# Patient Record
Sex: FEMALE | HISPANIC OR LATINO
[De-identification: names, ages, dates, MRNs, and addresses within clinical notes are randomized per-mention and may not be internally consistent; named-entity substitution may affect disease eponyms.]

---

## 2017-01-26 ENCOUNTER — HOSPITAL (OUTPATIENT)
Dept: OTHER | Age: 47
End: 2017-01-26
Attending: INTERNAL MEDICINE

## 2018-09-06 ENCOUNTER — OFFICE VISIT (OUTPATIENT)
Dept: FAMILY MEDICINE CLINIC | Facility: CLINIC | Age: 48
End: 2018-09-06

## 2018-09-06 VITALS
HEART RATE: 81 BPM | HEIGHT: 63 IN | WEIGHT: 153 LBS | SYSTOLIC BLOOD PRESSURE: 107 MMHG | DIASTOLIC BLOOD PRESSURE: 74 MMHG | BODY MASS INDEX: 27.11 KG/M2 | TEMPERATURE: 98 F

## 2018-09-06 DIAGNOSIS — R19.8 UMBILICAL DISCHARGE: Primary | ICD-10-CM

## 2018-09-06 DIAGNOSIS — H92.02 LEFT EAR PAIN: ICD-10-CM

## 2018-09-06 PROCEDURE — 99203 OFFICE O/P NEW LOW 30 MIN: CPT | Performed by: FAMILY MEDICINE

## 2018-09-06 PROCEDURE — 99212 OFFICE O/P EST SF 10 MIN: CPT | Performed by: FAMILY MEDICINE

## 2018-09-06 NOTE — PROGRESS NOTES
Donna Ann is a 51year old female. Patient presents with:   Other: Pt has belly button fluid leaking for the past 7-8 years and has foul smell, pt states she has itching and pain as well, on and off    HPI:   Reports drainage - foul odor - for p for CT to check for cyst  - CT ABDOMEN (W+WO) (CPT=74170); Future    2. Left ear pain  Normal exam today. Possible TMJ      The patient indicates understanding of these issues and agrees to the plan.       Shannan Jarrett MD  9/6/2018  11:23 AM

## 2018-09-11 ENCOUNTER — TELEPHONE (OUTPATIENT)
Dept: FAMILY MEDICINE CLINIC | Facility: CLINIC | Age: 48
End: 2018-09-11

## 2018-09-11 NOTE — TELEPHONE ENCOUNTER
Pt states she was trying to schedule her CT scan and was advised to contact PCP to request an authorization code.  They advised pt that her insurance had not yet approved the CT scan and to contact PCP to see if  got an authorization code to continue to s

## 2018-09-13 ENCOUNTER — HOSPITAL ENCOUNTER (OUTPATIENT)
Dept: CT IMAGING | Facility: HOSPITAL | Age: 48
Discharge: HOME | End: 2018-09-13
Attending: FAMILY MEDICINE
Payer: COMMERCIAL

## 2018-09-13 DIAGNOSIS — R19.8 UMBILICAL DISCHARGE: ICD-10-CM

## 2018-09-13 LAB — CREAT BLD-MCNC: 0.7 MG/DL (ref 0.5–1.5)

## 2018-09-13 PROCEDURE — 74177 CT ABD & PELVIS W/CONTRAST: CPT | Performed by: FAMILY MEDICINE

## 2018-09-13 PROCEDURE — 82565 ASSAY OF CREATININE: CPT

## 2018-09-17 NOTE — PROGRESS NOTES
CT shows nothing internally around her umbilicus area. Next time she has drainage she needs to make appt right away to be examined. The bladder was enlarged.  I would like pt to have an urinalysis to further assess that - I ordered it

## 2018-09-29 NOTE — PROGRESS NOTES
Attempted to call pt [phone] (H), phone ring then goes to busy signal.   Unable to reach pt at this time.

## 2018-10-04 ENCOUNTER — TELEPHONE (OUTPATIENT)
Dept: FAMILY MEDICINE CLINIC | Facility: CLINIC | Age: 48
End: 2018-10-04

## 2018-10-05 NOTE — TELEPHONE ENCOUNTER
See message below. I called home and [employer] telephone numbers on file without success and no option to leave a message. Staff to call again later.      Notes recorded by Mariaa Camarillo MD on 9/17/2018 at 12:36 PM CDT  CT shows nothing internally around he

## 2018-10-06 NOTE — TELEPHONE ENCOUNTER
Late entry: On 10/5/18 No answer at home telephone and unable to leave a message. Call again at later.

## 2018-10-06 NOTE — TELEPHONE ENCOUNTER
Home and cell telephone numbers on file with no response nor options to leave a voice mail message. Clinical staff to call again later.

## 2018-10-08 NOTE — TELEPHONE ENCOUNTER
Patient informed of results, and given MD's recommendations. Patient verbalized understanding with intent to comply. Questions answered. She intends to have UA done this week. Patient to keep appt as scheduled.     Patient denied any umbilical drainage at

## 2018-10-12 ENCOUNTER — LAB ENCOUNTER (OUTPATIENT)
Dept: LAB | Age: 48
End: 2018-10-12
Attending: FAMILY MEDICINE
Payer: COMMERCIAL

## 2018-10-12 DIAGNOSIS — R93.5 ABNORMAL CT OF THE ABDOMEN: ICD-10-CM

## 2018-10-12 PROCEDURE — 81001 URINALYSIS AUTO W/SCOPE: CPT

## 2018-10-25 ENCOUNTER — OFFICE VISIT (OUTPATIENT)
Dept: FAMILY MEDICINE CLINIC | Facility: CLINIC | Age: 48
End: 2018-10-25

## 2018-10-25 ENCOUNTER — LAB ENCOUNTER (OUTPATIENT)
Dept: LAB | Age: 48
End: 2018-10-25
Attending: FAMILY MEDICINE
Payer: COMMERCIAL

## 2018-10-25 VITALS
TEMPERATURE: 98 F | DIASTOLIC BLOOD PRESSURE: 71 MMHG | BODY MASS INDEX: 29.79 KG/M2 | WEIGHT: 157.81 LBS | HEART RATE: 77 BPM | HEIGHT: 61 IN | SYSTOLIC BLOOD PRESSURE: 103 MMHG | RESPIRATION RATE: 14 BRPM

## 2018-10-25 DIAGNOSIS — G47.00 INSOMNIA, UNSPECIFIED TYPE: ICD-10-CM

## 2018-10-25 DIAGNOSIS — Z12.31 SCREENING MAMMOGRAM, ENCOUNTER FOR: Primary | ICD-10-CM

## 2018-10-25 DIAGNOSIS — Z01.419 WELL FEMALE EXAM WITH ROUTINE GYNECOLOGICAL EXAM: ICD-10-CM

## 2018-10-25 PROCEDURE — 85025 COMPLETE CBC W/AUTO DIFF WBC: CPT

## 2018-10-25 PROCEDURE — 36415 COLL VENOUS BLD VENIPUNCTURE: CPT

## 2018-10-25 PROCEDURE — 82306 VITAMIN D 25 HYDROXY: CPT

## 2018-10-25 PROCEDURE — 84443 ASSAY THYROID STIM HORMONE: CPT

## 2018-10-25 PROCEDURE — 80061 LIPID PANEL: CPT

## 2018-10-25 PROCEDURE — 99396 PREV VISIT EST AGE 40-64: CPT | Performed by: FAMILY MEDICINE

## 2018-10-25 PROCEDURE — 82607 VITAMIN B-12: CPT

## 2018-10-25 PROCEDURE — 80053 COMPREHEN METABOLIC PANEL: CPT

## 2018-10-25 RX ORDER — AMITRIPTYLINE HYDROCHLORIDE 10 MG/1
10 TABLET, FILM COATED ORAL NIGHTLY
Qty: 30 TABLET | Refills: 1 | Status: SHIPPED | OUTPATIENT
Start: 2018-10-25 | End: 2019-01-05

## 2018-10-25 NOTE — PROGRESS NOTES
HPI:   Donna Ann is a 51year old female who presents for a complete physical exam.   Patient's last menstrual period was 06/01/2018. Having problems with sleep. Reports problems sleeping for past few months.  Reports cannot remember things any (Oral)   Resp 14   Ht 5' 1\" (1.549 m)   Wt 157 lb 12.8 oz (71.6 kg)   LMP 06/01/2018   Breastfeeding? No   BMI 29.82 kg/m²   Body mass index is 29.82 kg/m².    GENERAL: well developed, well nourished,in no apparent distress  SKIN: no rashes,no suspicious l 10/25/2019      TSH W Reflex To Free T4 [E]          Standing Status: Future          Standing Expiration Date: 10/25/2019      Vitamin D, 25-Hydroxy [E]          Standing Status: Future          Standing Expiration Date: 10/25/2019      Vitamin B12

## 2018-11-01 NOTE — PROGRESS NOTES
Vitamin D levels are low. Pt to take weekly vit d 50,000 for 3 months and when complete continue with the vitamin D daily over the counter 2000 units. Very important to continue taking supplements as low in the past also.    Cholesterol mildly elevated  - w

## 2018-11-05 ENCOUNTER — HOSPITAL ENCOUNTER (OUTPATIENT)
Dept: MAMMOGRAPHY | Age: 48
Discharge: HOME | End: 2018-11-05
Attending: FAMILY MEDICINE
Payer: COMMERCIAL

## 2018-11-05 DIAGNOSIS — Z12.31 SCREENING MAMMOGRAM, ENCOUNTER FOR: ICD-10-CM

## 2018-11-05 DIAGNOSIS — Z01.419 WELL FEMALE EXAM WITH ROUTINE GYNECOLOGICAL EXAM: ICD-10-CM

## 2018-11-05 PROCEDURE — 77067 SCR MAMMO BI INCL CAD: CPT | Performed by: FAMILY MEDICINE

## 2018-11-05 PROCEDURE — 77063 BREAST TOMOSYNTHESIS BI: CPT | Performed by: FAMILY MEDICINE

## 2018-11-09 ENCOUNTER — TELEPHONE (OUTPATIENT)
Dept: OTHER | Age: 48
End: 2018-11-09

## 2018-11-09 NOTE — TELEPHONE ENCOUNTER
Attempted to call pt, rings, no answer, no voice mail box set up. Multiple attempts to contact pt. No response mailed to pt address on file.   Will inform MD                Notes recorded by Roselind Peabody, RN on 11/9/2018 at 8:37 AM CST  See results

## 2019-01-07 RX ORDER — AMITRIPTYLINE HYDROCHLORIDE 10 MG/1
TABLET, FILM COATED ORAL
Qty: 30 TABLET | Refills: 2 | Status: SHIPPED | OUTPATIENT
Start: 2019-01-07

## (undated) NOTE — LETTER
9/21/2018              [phone] [id number]         Dear Christiaan Rudolph,    This letter is to inform you that our office has made several attempts to reach you by phone without success.   We were attempting to

## (undated) NOTE — LETTER
11/9/2018              [id number] [facility] [address]         Dear Fabrizio Schroeder,    This letter is to inform you that our office has made several attempts to reach you by phone without success.   We were attempting to contact yo